# Patient Record
Sex: FEMALE | NOT HISPANIC OR LATINO | URBAN - METROPOLITAN AREA
[De-identification: names, ages, dates, MRNs, and addresses within clinical notes are randomized per-mention and may not be internally consistent; named-entity substitution may affect disease eponyms.]

---

## 2018-01-26 ENCOUNTER — TRANSFERRED RECORDS (OUTPATIENT)
Dept: HEALTH INFORMATION MANAGEMENT | Facility: CLINIC | Age: 48
End: 2018-01-26

## 2018-01-26 LAB
MEASLES CONFIRMATION PCR: POSITIVE
MEASLES CONFIRMATION PCR: POSITIVE
MEASLES SPECIMEN TYPE: ABNORMAL
MUMPS IMMUNE STATUS: POSITIVE
RUBELLA IMMUNITY: POSITIVE

## 2018-10-01 ASSESSMENT — ENCOUNTER SYMPTOMS
JOINT SWELLING: 0
BACK PAIN: 1
MYALGIAS: 0
NECK PAIN: 1
HYPOTENSION: 0
SYNCOPE: 0
MUSCLE CRAMPS: 0
ARTHRALGIAS: 1
HYPERTENSION: 1
LEG PAIN: 1
SLEEP DISTURBANCES DUE TO BREATHING: 0
STIFFNESS: 1
PALPITATIONS: 1
ORTHOPNEA: 0
LIGHT-HEADEDNESS: 0
MUSCLE WEAKNESS: 0
EXERCISE INTOLERANCE: 0

## 2018-10-03 ENCOUNTER — HEALTH MAINTENANCE LETTER (OUTPATIENT)
Age: 48
End: 2018-10-03

## 2018-10-11 ENCOUNTER — OFFICE VISIT (OUTPATIENT)
Dept: INTERNAL MEDICINE | Facility: CLINIC | Age: 48
End: 2018-10-11
Payer: COMMERCIAL

## 2018-10-11 VITALS
HEART RATE: 65 BPM | BODY MASS INDEX: 31.36 KG/M2 | WEIGHT: 170.4 LBS | HEIGHT: 62 IN | DIASTOLIC BLOOD PRESSURE: 79 MMHG | SYSTOLIC BLOOD PRESSURE: 118 MMHG

## 2018-10-11 DIAGNOSIS — Z12.31 ENCOUNTER FOR SCREENING MAMMOGRAM FOR BREAST CANCER: ICD-10-CM

## 2018-10-11 DIAGNOSIS — I10 BENIGN ESSENTIAL HYPERTENSION WITH TARGET BLOOD PRESSURE BELOW 140/90: Primary | ICD-10-CM

## 2018-10-11 DIAGNOSIS — I10 BENIGN ESSENTIAL HYPERTENSION: ICD-10-CM

## 2018-10-11 DIAGNOSIS — E78.01 FAMILIAL HYPERCHOLESTEROLEMIA: ICD-10-CM

## 2018-10-11 DIAGNOSIS — Z29.9 PREVENTIVE MEASURE: ICD-10-CM

## 2018-10-11 RX ORDER — LISINOPRIL 5 MG/1
5 TABLET ORAL DAILY
Qty: 90 TABLET | Refills: 3 | Status: SHIPPED | OUTPATIENT
Start: 2018-10-11 | End: 2019-10-10 | Stop reason: SINTOL

## 2018-10-11 RX ORDER — LISINOPRIL 5 MG/1
5 TABLET ORAL DAILY
Refills: 0 | COMMUNITY
Start: 2018-09-04 | End: 2018-10-11

## 2018-10-11 RX ORDER — LISINOPRIL 5 MG/1
5 TABLET ORAL DAILY
Qty: 90 TABLET | Refills: 3 | Status: SHIPPED | OUTPATIENT
Start: 2018-10-11 | End: 2018-10-11

## 2018-10-11 ASSESSMENT — PAIN SCALES - GENERAL: PAINLEVEL: NO PAIN (0)

## 2018-10-11 NOTE — PATIENT INSTRUCTIONS
Please call [phone] for any billing questions.     Primary Care Center [phone] ([facility])     Please call [phone] to schedule future fasting lab appointment.     Breast Center ([facility]) [phone] ([facility])  Breast Center ([facility]) [phone] ([address])

## 2018-10-11 NOTE — NURSING NOTE
Called [facility] on [address] and cancelled Lisinopril Rx, stated [facility] is not in pt Network, [facility] is or [facility], spoke with patient requested Rx to be called  Into [facility], Lisinopril was called into [facility], also called [facility] on [address] to cancel Rx.

## 2018-10-11 NOTE — NURSING NOTE
Chief Complaint   Patient presents with     Establish Care     Recheck Medication     Blood pressure medication.       Nicole Block LPN at 4:44 PM on October 11, 2018

## 2018-10-11 NOTE — MR AVS SNAPSHOT
After Visit Summary   10/11/2018    Carol Laurent    MRN: [id number]           Patient Information     Date Of Birth          1970        Visit Information        Provider Department      10/11/2018 4:30 PM Kristin Calhoun, JUVE [facility]        Today's Diagnoses     Benign essential hypertension with target blood pressure below 140/90    -  1    Encounter for screening mammogram for breast cancer          Care Instructions    Please call [phone] for any billing questions.     Primary Care Center [phone] ([facility])     Please call [phone] to schedule future fasting lab appointment.     Breast Center ([facility]) [phone] ([facility] [place] [facility])  Breast Center ([facility]) [phone] ([address])                 Follow-ups after your visit        Your next 10 appointments already scheduled     Oct 16, 2018  7:00 AM CDT   LAB with  LAB    Health Lab ([facility])    [address]  1st Floor  [place] [address] [phone]           Please do not eat 10-12 hours before your appointment if you are coming in fasting for labs on lipids, cholesterol, or glucose (sugar). This does not apply to pregnant women. Water, hot tea and black coffee (with nothing added) are okay. Do not drink other fluids, diet soda or chew gum.            Oct 16, 2018  7:30 AM CDT   MA SCREENING DIGITAL BILATERAL with [id number]   [facility] ([facility])    [address], 2nd Floor  [place] [address] [phone]           How do I prepare for my exam? (Food and drink instructions) No Food and Drink Restrictions.  How do I prepare for my exam? (Other instructions) Do not use any powder, lotion or deodorant under your arms or on your breast. If you do, we will ask you to remove it before your exam.  What should I wear: Wear comfortable, two-piece clothing.   "How long does the exam take: Most scans will take 15 minutes.  What should I bring: Bring any previous mammograms from other facilities or have them mailed to the breast center.  Do I need a :  No  is needed.  What do I need to tell my doctor: If you have any allergies, tell your care team.  What should I do after the exam: No restrictions, You may resume normal activities.  What is this test: This test is an x-ray of the breast to look for breast disease. The breast is pressed between two plates to flatten and spread the tissue. An X-ray is taken of the breast from different angles.  Who should I call with questions: If you have any questions, please call the Imaging Department where you will have your exam. Directions, parking instructions, and other information is available on our website, [employer].  Other information about my exam Three-dimensional (3D) mammograms are available at [facility] locations in [facility] and [place]. [facility] locations include [place] and [facility] [place] in [place].  Benefits of 3D mammograms include * Improved rate of cancer detection * Decreases your chance of having to go back for more tests, which means fewer: * \"False-positive\" results (This means that there is an abnormal area but it isn't cancer.) * Invasive testing procedures, such as a biopsy or surgery * Can provide clearer images of the breast if you have dense breast tissue.  *3D mammography is an optional exam that anyone can have with a 2D mammogram. It doesn't replace or take the place of a 2D mammogram. 2D mammograms remain an effective screening test for all women.  Not all insurance companies cover the cost of a 3D mammogram. Check with your insurance. Three-dimensional (3D) mammograms are available at [facility] locations in [facility], [facility], [place], [place], and " "[place]. [facility] locations include [facility] [place] in [place]. Benefits of 3D mammograms include: - Improved rate of cancer detection - Decreases your chance of having to go back for more tests, which means fewer: - \"False-positive\" results (This means that there is an abnormal area but it isn't cancer.) - Invasive testing procedures, such as a biopsy or surgery - Can provide clearer images of the breast if you have dense breast tissue. 3D mammography is an optional exam that anyone can have with a 2D mammogram. It doesn't replace or take the place of a 2D mammogram. 2D mammograms remain an effective screening test for all women.  Not all insurance companies cover the cost of a 3D mammogram. Check with your insurance.              Future tests that were ordered for you today     Open Future Orders        Priority Expected Expires Ordered    Mammogram, routine screening Routine  10/11/2019 10/11/2018    Lipid panel reflex to direct LDL Fasting Routine 10/12/2018 10/25/2018 10/11/2018    Basic metabolic panel Routine 10/12/2018 10/11/2019 10/11/2018    CBC with platelets Routine 10/12/2018 10/25/2018 10/11/2018            Who to contact     Please call your clinic at [phone] to:    Ask questions about your health    Make or cancel appointments    Discuss your medicines    Learn about your test results    Speak to your doctor            Additional Information About Your Visit        [employer] Information     [employer] gives you secure access to your electronic health record. If you see a primary care provider, you can also send messages to your care team and make appointments. If you have questions, please call your primary care clinic.  If you do not have a primary care provider, please call [phone] and they will assist you.      [employer] is an electronic gateway that provides easy, online access to your medical records. With [employer], you can request a clinic appointment, read your " "test results, renew a prescription or communicate with your care team.     To access your existing account, please contact your [facility] or call [phone] for assistance.        Care EveryWhere ID     This is your Care EveryWhere ID. This could be used by other organizations to access your [place] medical records  [id number]        Your Vitals Were     Pulse Height Last Period Breastfeeding? BMI (Body Mass Index)       65 1.581 m (5' 2.25\") 09/11/2018 (Approximate) No 30.92 kg/m2        Blood Pressure from Last 3 Encounters:   10/11/18 118/79    Weight from Last 3 Encounters:   10/11/18 77.3 kg (170 lb 6.4 oz)                 Today's Medication Changes          These changes are accurate as of 10/11/18  5:42 PM.  If you have any questions, ask your nurse or doctor.               Start taking these medicines.        Dose/Directions    lisinopril 5 MG tablet   Commonly known as:  PRINIVIL/ZESTRIL   Used for:  Benign essential hypertension with target blood pressure below 140/90   Started by:  Kristin Calhoun APRN CNP        Dose:  5 mg   Take 1 tablet (5 mg) by mouth daily   Quantity:  90 tablet   Refills:  3            Where to get your medicines      These medications were sent to [place] [address] [place] [address] [address]     Phone:  [phone]     lisinopril 5 MG tablet                Primary Care Provider Office Phone # Fax #    [facility] [phone]       [address] [place] [id number]        Equal Access to Services     SARAH CHAIREZ AH: Hadii tati smith Sotammy, waaxda luqadaha, qaybta kaalmada yessenia alegre. So [facility] [phone].    ATENCIÓN: Si habla español, tiene a warren disposición servicios gratuitos de asistencia lingüística. Llame al [phone].    We comply with applicable federal civil rights laws and [place] laws. We do not " discriminate on the basis of race, color, national origin, age, disability, sex, sexual orientation, or gender identity.            Thank you!     Thank you for choosing [facility]  for your care. Our goal is always to provide you with excellent care. Hearing back from our patients is one way we can continue to improve our services. Please take a few minutes to complete the written survey that you may receive in the mail after your visit with us. Thank you!             Your Updated Medication List - Protect others around you: Learn how to safely use, store and throw away your medicines at www.disposemymeds.org.          This list is accurate as of 10/11/18  5:42 PM.  Always use your most recent med list.                   Brand Name Dispense Instructions for use Diagnosis    lisinopril 5 MG tablet    PRINIVIL/ZESTRIL    90 tablet    Take 1 tablet (5 mg) by mouth daily    Benign essential hypertension with target blood pressure below 140/90

## 2018-10-11 NOTE — PROGRESS NOTES
[facility]  Primary Care Center   Kristin MONTEIRORae Calhoun, APRN CNP  10/11/2018      Chief Complaint:   Establish Care and Recheck Medication       History of Present Illness:   Carol Laurent is a 48 year old female accompanied by her 17 yo son, with a history of elevated cholesterol and fatty liver who presents to establish care. Carol would like to establish care at [facility] due to changes in her insurance policy. She was previously receiving care through the [facility], where she works. Carol reports that she has a history of elevated cholesterol and brings lab results from October 2017 (see results below).     Carol has a history of elevated blood pressure, and has been taking Lisinopril 5 mg daily for the past 6 months. Prior to starting Lisinopril, Carol reports her systolic pressure averaged around 130 and her diastolic blood pressure ranged from 93 to 99. Today her blood pressure is 118/79. She wonders if there is another medication that she should take instead of Lisinopril to better control her blood pressure, as she is concerned about the possibility of addiction or experiencing side effects, although she denies experiencing side effects.     Other concerns discussed:  Regarding her history of fatty liver, Carol reports that she was diagnosed with fatty liver while living in [place]. After undergoing another test in the [place] her liver tests appeared normal.     Carol reports that she sleeps 5 to 6 hours per night, and struggles to sleep deeply. She adds that it usually takes about 1 hour for her to fall asleep, and wakes up on her own without an alarm. She denies feeling tired during the day, but does report that she drinks 2 to 3 cups of coffee each morning.     Carol believes her most recent pap smear took place in 2017. She does not believe she has ever undergone a mammogram, as [facility] did not provide those.    Carol reports that she runs 3 miles 3-5 times per week for exercise.    Carol was born in [place], and moved [place] the  [place] about [place] ago. She currently works at the [employer].     Carol has already received a flu vaccine.      Review of Systems:   Answers for HPI/ROS submitted by the patient on 10/1/2018   General Symptoms: No  Skin Symptoms: No  HENT Symptoms: No  EYE SYMPTOMS: No  HEART SYMPTOMS: Yes  LUNG SYMPTOMS: No  INTESTINAL SYMPTOMS: No  URINARY SYMPTOMS: No  GYNECOLOGIC SYMPTOMS: No  BREAST SYMPTOMS: No  SKELETAL SYMPTOMS: Yes  BLOOD SYMPTOMS: No  NERVOUS SYSTEM SYMPTOMS: No  MENTAL HEALTH SYMPTOMS: No  Chest pain or pressure: No  Fast or irregular heartbeat: Yes  Pain in legs with walking: Yes  Trouble breathing while lying down: No  Fingers or toes appear blue: No  High blood pressure: Yes  Low blood pressure: No  Fainting: No  Murmurs: No  Pacemaker: No  Varicose veins: No  Edema or swelling: No  Wake up at night with shortness of breath: No  Light-headedness: No  Exercise intolerance: No  Back pain: Yes  Muscle aches: No  Neck pain: Yes  Swollen joints: No  Joint pain: Yes  Bone pain: No  Muscle cramps: No  Muscle weakness: No  Joint stiffness: Yes      Active Medications:   Current Outpatient Prescriptions:      lisinopril (PRINIVIL/ZESTRIL) 5 MG tablet, Take 1 tablet (5 mg) by mouth daily, Disp: 90 tablet, Rfl: 3    Allergies:   Review of patient's allergies indicates no known allergies.      Past Medical History:  Elevated cholesterol  Elevated blood pressure  Fatty liver    Past Surgical History:  The patient does not have any past pertinent surgical history.    Family History   Problem Relation Age of Onset     HEART DISEASE Mother      Hypertension Father      Diabetes Father      Hypertension Brother      Liver Disease Brother         Social History     Social History     Marital status:      Spouse name: N/A     Number of children: 1     Years of education: N/A     Occupational History     softwear       Social History Main Topics     Smoking status: Never Smoker      "Smokeless tobacco: Never Used     Alcohol use No     Drug use: Not on file     Sexual activity: Not on file     Other Topics Concern     Not on file     Social History Narrative    Native of [place] 2014.     One son.     .   in [place].          Physical Exam:   /79  Pulse 65  Ht 1.581 m (5' 2.25\")  Wt 77.3 kg (170 lb 6.4 oz)  LMP 09/11/2018 (Approximate)  Breastfeeding? No  BMI 30.92 kg/m2   Wt Readings from Last 1 Encounters:   10/11/18 77.3 kg (170 lb 6.4 oz)     Constitutional: no distress, comfortable, pleasant   Eyes: anicteric, conjunctiva pink, normal extra-ocular movements   Ears, Nose and Throat: tympanic membranes clear,  throat clear.   Neck: supple with full range of motion, no thyromegaly.   Cardiovascular: regular rate and rhythm, normal S1 and S2, no murmurs, rubs or gallops, peripheral pulses full and symmetric   Respiratory: clear to auscultation, no wheezes or crackles, normal breath sounds   Gastrointestinal: positive bowel sounds, nontender, no hepatosplenomegaly, no masses   Musculoskeletal: full range of motion, no edema   Breast exam was not done today as her son was with her and her schedule did not allow.  Skin: no concerning lesions, no jaundice, temp normal   Neurological:  normal speech, no tremor. A and O x 3, good historian.  Psychological: appropriate mood, good eye contact, normal affect   Lymph: no cervical,  supraclavicular, infraclavicular nodes.      Results Discussed:  Cholesterol, October 2017:  Total cholesterol: 187  LDL: 110  Triglycerides: 167  HDL: 49     Assessment and Plan:  Benign essential hypertension with target blood pressure below 140/90  Medina will continue to take Lisinopril 5 mg daily. She is due for a routine lipid panel, as well as a basic metabolic panel and CBC.     - lisinopril (PRINIVIL/ZESTRIL) 5 MG tablet  Dispense: 90 tablet; Refill: 3  - Lipid panel reflex to direct LDL Fasting  - Basic metabolic panel  - CBC with " platelets    Encounter for screening mammogram for breast cancer  See HPI.  - Mammogram, routine screening        Scribe Disclosure:   I, Marilynn Barertt, am serving as a scribe to document services personally performed by JUVE Hsieh CNP at this visit, based upon the provider's statements to me. All documentation has been reviewed by the aforementioned provider prior to being entered into the official medical record.     Portions of this medical record were completed by a scribe. UPON MY REVIEW AND AUTHENTICATION BY ELECTRONIC SIGNATURE, this confirms (a) I performed the applicable clinical services, and (b) the record is accurate.     Total time spent 30  minutes.  More than 50% of the time spent with Ms. Laurent on counseling / coordinating her care.  Kristin AN, CNP

## 2018-10-16 ENCOUNTER — RADIANT APPOINTMENT (OUTPATIENT)
Dept: MAMMOGRAPHY | Facility: CLINIC | Age: 48
End: 2018-10-16
Attending: NURSE PRACTITIONER
Payer: COMMERCIAL

## 2018-10-16 DIAGNOSIS — Z12.31 ENCOUNTER FOR SCREENING MAMMOGRAM FOR BREAST CANCER: ICD-10-CM

## 2018-10-16 DIAGNOSIS — I10 BENIGN ESSENTIAL HYPERTENSION WITH TARGET BLOOD PRESSURE BELOW 140/90: ICD-10-CM

## 2018-10-16 PROBLEM — E78.01 FAMILIAL HYPERCHOLESTEROLEMIA: Status: ACTIVE | Noted: 2018-10-16

## 2018-10-16 LAB
ANION GAP SERPL CALCULATED.3IONS-SCNC: 4 MMOL/L (ref 3–14)
BUN SERPL-MCNC: 14 MG/DL (ref 7–30)
CALCIUM SERPL-MCNC: 8.7 MG/DL (ref 8.5–10.1)
CHLORIDE SERPL-SCNC: 104 MMOL/L (ref 94–109)
CHOLEST SERPL-MCNC: 174 MG/DL
CO2 SERPL-SCNC: 29 MMOL/L (ref 20–32)
CREAT SERPL-MCNC: 0.77 MG/DL (ref 0.52–1.04)
ERYTHROCYTE [DISTWIDTH] IN BLOOD BY AUTOMATED COUNT: 12.3 % (ref 10–15)
GFR SERPL CREATININE-BSD FRML MDRD: 79 ML/MIN/1.7M2
GLUCOSE SERPL-MCNC: 95 MG/DL (ref 70–99)
HCT VFR BLD AUTO: 40.7 % (ref 35–47)
HDLC SERPL-MCNC: 44 MG/DL
HGB BLD-MCNC: 13.5 G/DL (ref 11.7–15.7)
LDLC SERPL CALC-MCNC: 104 MG/DL
MCH RBC QN AUTO: 32.5 PG (ref 26.5–33)
MCHC RBC AUTO-ENTMCNC: 33.2 G/DL (ref 31.5–36.5)
MCV RBC AUTO: 98 FL (ref 78–100)
NONHDLC SERPL-MCNC: 131 MG/DL
PLATELET # BLD AUTO: 270 10E9/L (ref 150–450)
POTASSIUM SERPL-SCNC: 4.2 MMOL/L (ref 3.4–5.3)
RBC # BLD AUTO: 4.16 10E12/L (ref 3.8–5.2)
SODIUM SERPL-SCNC: 137 MMOL/L (ref 133–144)
TRIGL SERPL-MCNC: 132 MG/DL
WBC # BLD AUTO: 8.8 10E9/L (ref 4–11)

## 2018-10-16 RX ORDER — ASCORBIC ACID 1000 MG
1 TABLET ORAL DAILY
Qty: 30 CAPSULE | Refills: 0
Start: 2018-10-16

## 2018-10-16 RX ORDER — CHLORAL HYDRATE 500 MG
2 CAPSULE ORAL DAILY
Refills: 0
Start: 2018-10-16

## 2018-10-16 RX ORDER — MULTIVITAMIN,THERAPEUTIC
1 TABLET ORAL DAILY
Refills: 0
Start: 2018-10-16

## 2018-10-29 ENCOUNTER — RADIANT APPOINTMENT (OUTPATIENT)
Dept: MAMMOGRAPHY | Facility: CLINIC | Age: 48
End: 2018-10-29
Attending: NURSE PRACTITIONER
Payer: COMMERCIAL

## 2018-10-29 ENCOUNTER — MEDICAL CORRESPONDENCE (OUTPATIENT)
Dept: HEALTH INFORMATION MANAGEMENT | Facility: CLINIC | Age: 48
End: 2018-10-29

## 2018-10-29 DIAGNOSIS — R92.8 ABNORMAL MAMMOGRAM OF LEFT BREAST: ICD-10-CM

## 2019-10-03 ASSESSMENT — ENCOUNTER SYMPTOMS
NECK MASS: 0
MUSCLE CRAMPS: 0
SINUS CONGESTION: 0
SORE THROAT: 1
BACK PAIN: 1
SINUS PAIN: 0
TROUBLE SWALLOWING: 0
DOUBLE VISION: 1
STIFFNESS: 0
EYE PAIN: 0
EYE IRRITATION: 0
SMELL DISTURBANCE: 0
EYE WATERING: 0
MUSCLE WEAKNESS: 0
TASTE DISTURBANCE: 0
EYE REDNESS: 0
HOARSE VOICE: 0
ARTHRALGIAS: 0
NECK PAIN: 1
MYALGIAS: 0
JOINT SWELLING: 0

## 2019-10-10 ENCOUNTER — OFFICE VISIT (OUTPATIENT)
Dept: INTERNAL MEDICINE | Facility: CLINIC | Age: 49
End: 2019-10-10
Payer: COMMERCIAL

## 2019-10-10 VITALS
OXYGEN SATURATION: 99 % | DIASTOLIC BLOOD PRESSURE: 77 MMHG | SYSTOLIC BLOOD PRESSURE: 110 MMHG | WEIGHT: 170 LBS | BODY MASS INDEX: 30.84 KG/M2 | HEART RATE: 64 BPM

## 2019-10-10 DIAGNOSIS — Z13.1 SCREENING FOR DIABETES MELLITUS: ICD-10-CM

## 2019-10-10 DIAGNOSIS — D25.9 UTERINE LEIOMYOMA, UNSPECIFIED LOCATION: ICD-10-CM

## 2019-10-10 DIAGNOSIS — I10 BENIGN ESSENTIAL HYPERTENSION: ICD-10-CM

## 2019-10-10 DIAGNOSIS — Z00.00 ENCOUNTER FOR ROUTINE ADULT HEALTH EXAMINATION WITHOUT ABNORMAL FINDINGS: ICD-10-CM

## 2019-10-10 DIAGNOSIS — Z00.00 ROUTINE HISTORY AND PHYSICAL EXAMINATION OF ADULT: Primary | ICD-10-CM

## 2019-10-10 DIAGNOSIS — Z76.89 ENCOUNTER TO ESTABLISH CARE WITH NEW DOCTOR: ICD-10-CM

## 2019-10-10 DIAGNOSIS — K76.0 FATTY LIVER: ICD-10-CM

## 2019-10-10 LAB
ALBUMIN SERPL-MCNC: 4 G/DL (ref 3.4–5)
ALP SERPL-CCNC: 80 U/L (ref 40–150)
ALT SERPL W P-5'-P-CCNC: 44 U/L (ref 0–50)
ANION GAP SERPL CALCULATED.3IONS-SCNC: 6 MMOL/L (ref 3–14)
AST SERPL W P-5'-P-CCNC: 33 U/L (ref 0–45)
BILIRUB SERPL-MCNC: 0.5 MG/DL (ref 0.2–1.3)
BUN SERPL-MCNC: 15 MG/DL (ref 7–30)
CALCIUM SERPL-MCNC: 9 MG/DL (ref 8.5–10.1)
CHLORIDE SERPL-SCNC: 105 MMOL/L (ref 94–109)
CHOLEST SERPL-MCNC: 176 MG/DL
CO2 SERPL-SCNC: 28 MMOL/L (ref 20–32)
CREAT SERPL-MCNC: 0.68 MG/DL (ref 0.52–1.04)
GFR SERPL CREATININE-BSD FRML MDRD: >90 ML/MIN/{1.73_M2}
GLUCOSE SERPL-MCNC: 95 MG/DL (ref 70–99)
HBA1C MFR BLD: 5.8 % (ref 0–5.6)
HDLC SERPL-MCNC: 51 MG/DL
LDLC SERPL CALC-MCNC: 103 MG/DL
NONHDLC SERPL-MCNC: 125 MG/DL
POTASSIUM SERPL-SCNC: 4.1 MMOL/L (ref 3.4–5.3)
PROT SERPL-MCNC: 7.8 G/DL (ref 6.8–8.8)
SODIUM SERPL-SCNC: 139 MMOL/L (ref 133–144)
TRIGL SERPL-MCNC: 109 MG/DL
TSH SERPL DL<=0.005 MIU/L-ACNC: 1.84 MU/L (ref 0.4–4)

## 2019-10-10 RX ORDER — LOSARTAN POTASSIUM 25 MG/1
12.5 TABLET ORAL DAILY
Qty: 30 TABLET | Refills: 6 | Status: SHIPPED | OUTPATIENT
Start: 2019-10-10 | End: 2019-11-07

## 2019-10-10 ASSESSMENT — ENCOUNTER SYMPTOMS
SYNCOPE: 0
DECREASED LIBIDO: 0
PARALYSIS: 0
FEVER: 0
DISTURBANCES IN COORDINATION: 0
LEG PAIN: 0
FLANK PAIN: 0
MEMORY LOSS: 0
PALPITATIONS: 0
ABDOMINAL PAIN: 0
MUSCLE CRAMPS: 0
SEIZURES: 0
DIARRHEA: 0
HEADACHES: 0
POLYDIPSIA: 0
NECK PAIN: 1
EYE PAIN: 0
JAUNDICE: 0
HOT FLASHES: 0
EXTREMITY NUMBNESS: 0
POOR WOUND HEALING: 0
RECTAL BLEEDING: 0
NERVOUS/ANXIOUS: 0
EXERCISE INTOLERANCE: 0
HEMOPTYSIS: 0
TACHYCARDIA: 0
TASTE DISTURBANCE: 0
SPUTUM PRODUCTION: 0
PANIC: 0
STIFFNESS: 0
BLOOD IN STOOL: 0
NECK MASS: 0
LOSS OF CONSCIOUSNESS: 0
WHEEZING: 0
FATIGUE: 0
RESPIRATORY PAIN: 0
TINGLING: 0
DOUBLE VISION: 1
TREMORS: 0
HOARSE VOICE: 0
POSTURAL DYSPNEA: 0
BLOATING: 0
SKIN CHANGES: 0
SLEEP DISTURBANCES DUE TO BREATHING: 0
BREAST PAIN: 0
CLAUDICATION: 0
POLYPHAGIA: 0
DIZZINESS: 0
MUSCLE WEAKNESS: 0
EYE WATERING: 0
SPEECH CHANGE: 0
JOINT SWELLING: 0
HEARTBURN: 0
HEMATURIA: 0
SORE THROAT: 1
SINUS PAIN: 0
WEIGHT LOSS: 0
HALLUCINATIONS: 0
SMELL DISTURBANCE: 0
ALTERED TEMPERATURE REGULATION: 0
SNORES LOUDLY: 0
RECTAL PAIN: 0
ORTHOPNEA: 0
HYPOTENSION: 0
CHILLS: 0
CONSTIPATION: 0
TROUBLE SWALLOWING: 0
NAUSEA: 0
DECREASED APPETITE: 0
LEG SWELLING: 0
NIGHT SWEATS: 0
WEAKNESS: 0
SHORTNESS OF BREATH: 0
DYSPNEA ON EXERTION: 0
EYE REDNESS: 0
DECREASED CONCENTRATION: 0
HYPERTENSION: 0
DYSURIA: 0
NUMBNESS: 0
BRUISES/BLEEDS EASILY: 0
INCREASED ENERGY: 0
ARTHRALGIAS: 0
BOWEL INCONTINENCE: 0
LIGHT-HEADEDNESS: 0
BREAST MASS: 0
MYALGIAS: 0
EYE IRRITATION: 0
COUGH DISTURBING SLEEP: 0
NAIL CHANGES: 0
SWOLLEN GLANDS: 0
SINUS CONGESTION: 0
DIFFICULTY URINATING: 0
WEIGHT GAIN: 0
INSOMNIA: 0
BACK PAIN: 1
VOMITING: 0
DEPRESSION: 0
COUGH: 0

## 2019-10-10 NOTE — NURSING NOTE
Chief Complaint   Patient presents with     Physical     pt is also interested in blood work     Kimberly Nissen, EMT at 9:01 AM on 10/10/2019

## 2019-10-10 NOTE — PATIENT INSTRUCTIONS
It was nice to see you today. Head down to the lab for blood work. Let's plan on meeting again in 3 weeks.

## 2019-10-10 NOTE — PROGRESS NOTES
[facility]         HPI:       Carol Laurent is a 49 year old female with a history of HTN, HLD, NAFLD, who presents to establish care. The patient takes lisinopril for HTN; notes that intermittently, she has a cough with this agent that improves if she holds some doses, and which restarts when she reinitiates this agent. The patient states she exercises 3 times a week, runs about 3 miles for about 40 minutes. Notes she has significant stress from her graduate studies, and running/exercise are a great outlet for this stress. 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 Her goal is to lose 10 lbs through exercise. The patient endorses a headache, mostly on the right side. No retroorbital pain, no aura, no photosensitivity. Also endorses blurry vision at night, mostly after she has been on her computer for several hours. The patient notes that she has significantly increased computer use at night and has noticed vision changes since her screen time has increased. Has a traditional Chinese diet, eats pork or chicken, vegetables and bread/oatmeal/rice with each meal. Does not control her salt intake. The patient notes that she previously had palpitations, about 5 years ago, attributes partly to heavy EtOH intake. Stopped drinking EtOH about 5 years ago in [place]; notes she was told at the time that she has fatty liver seen on RUQ U/S. Has not had repeat evaluation in the [place]. Has intermittent nausea as well as lightheadedness. Denies fever, chills, emesis, dizziness, chest pain, shortness of breath, palpitations, abdominal pain, diarrhea, constipation, hematochezia, melena, dysuria or hematuria.     Problem, Medication and Allergy Lists were reviewed and are current.     Patient Active Problem List    Diagnosis Date Noted     Benign essential hypertension 10/16/2018     Priority: Medium     Familial hypercholesterolemia 10/16/2018     Priority: Medium         Current Outpatient Medications    Medication Sig Dispense Refill     Calcium 500-100 MG-UNIT CHEW Take 1 chew tab by mouth daily       Coenzyme Q10 (CO Q 10) 10 MG CAPS Take 1 capsule by mouth daily 30 capsule 0     fish oil-omega-3 fatty acids 1000 MG capsule Take 2 capsules (2 g) by mouth daily  0     losartan (COZAAR) 25 MG tablet Take 0.5 tablets (12.5 mg) by mouth daily 30 tablet 6     Misc Natural Products ([employer] GLUCOSAMINE-CHONDROITIN) CAPS Take 1 capsule by mouth daily       multivitamin, therapeutic (THERA-VIT) TABS tablet Take 1 tablet by mouth daily  0     No Known Allergies    Patient is a new patient to this clinic and so I reviewed/updated the Past Medical History, the Family History and the Social History.    Past Medical History:   Diagnosis Date     Benign essential HTN      Hyperlipidemia      MILLER (nonalcoholic steatohepatitis) 2006    dx in [place] with subsequent 10 lb. weight loss     Nonalcoholic fatty liver disease without nonalcoholic steatohepatitis (MILLER)      Family History     Problem (# of Occurrences) Relation (Name,Age of Onset)    Diabetes (2) Father, Paternal Grandmother    Heart Disease (1) Mother    Hypertension (2) Father, Brother    Liver Disease (1) Brother        Social History     Socioeconomic History     Marital status:      Spouse name: None     Number of children: 1     Years of education: None     Highest education level: None   Occupational History     Occupation: Education doctoral student   Social Needs     Financial resource strain: None     Food insecurity:     Worry: None     Inability: None     Transportation needs:     Medical: None     Non-medical: None   Tobacco Use     Smoking status: Never Smoker     Smokeless tobacco: Never Used   Substance and Sexual Activity     Alcohol use: No     Comment: Previous heavy use, >5 years ago     Drug use: Never     Sexual activity: Not Currently   Lifestyle     Physical activity:     Days per week: None     Minutes per session: None     Stress: None    Relationships     Social connections:     Talks on phone: None     Gets together: None     Attends Restoration service: None     Active member of club or organization: None     Attends meetings of clubs or organizations: None     Relationship status: None     Intimate partner violence:     Fear of current or ex partner: None     Emotionally abused: None     Physically abused: None     Forced sexual activity: None   Other Topics Concern     None   Social History Narrative    Immigrated from [place] in 2014. Lives alone in [place]. Son goes to college. Doctoral student in education.            Review of Systems:     Review of Systems     Constitutional:  Negative for fever, chills, weight loss, weight gain, fatigue, decreased appetite, night sweats, recent stressors, height gain, height loss, post-operative complications, incisional pain, hallucinations, increased energy, hyperactivity and confused.   HENT:  Positive for tinnitus and sore throat. Negative for ear pain, hearing loss, nosebleeds, trouble swallowing, hoarse voice, mouth sores, ear discharge, tooth pain, gum tenderness, taste disturbance, smell disturbance, hearing aid, bleeding gums, dry mouth, sinus pain, sinus congestion and neck mass.    Eyes:  Positive for double vision. Negative for pain, redness, eye pain, decreased vision, eye watering, eye bulging, eye dryness, flashing lights, spots, floaters, strabismus, tunnel vision, jaundice and eye irritation.   Respiratory:   Negative for cough, hemoptysis, sputum production, shortness of breath, wheezing, sleep disturbances due to breathing, snores loudly, respiratory pain, dyspnea on exertion, cough disturbing sleep and postural dyspnea.    Cardiovascular:  Negative for chest pain, dyspnea on exertion, palpitations, orthopnea, claudication, leg swelling, fingers/toes turn blue, hypertension, hypotension, syncope, history of heart murmur, chest pain on exertion, chest pain at rest, pacemaker, few scattered  varicosities, leg pain, sleep disturbances due to breathing, tachycardia, light-headedness, exercise intolerance and edema.   Gastrointestinal:  Negative for heartburn, nausea, vomiting, abdominal pain, diarrhea, constipation, blood in stool, melena, rectal pain, bloating, hemorrhoids, bowel incontinence, jaundice, rectal bleeding, coffee ground emesis and change in stool.   Genitourinary:  Negative for bladder incontinence, dysuria, urgency, hematuria, flank pain, vaginal discharge, difficulty urinating, genital sores, dyspareunia, decreased libido, nocturia, voiding less frequently, arousal difficulty, abnormal vaginal bleeding, excessive menstruation, menstrual changes, hot flashes, vaginal dryness and postmenopausal bleeding.   Musculoskeletal:  Positive for back pain and neck pain. Negative for myalgias, joint swelling, arthralgias, stiffness, muscle cramps, bone pain, muscle weakness and fracture.   Skin:  Negative for nail changes, itching, poor wound healing, rash, hair changes, skin changes, acne, warts, poor wound healing, scarring, flaky skin, Raynaud's phenomenon, sensitivity to sunlight and skin thickening.   Neurological:  Negative for dizziness, tingling, tremors, speech change, seizures, loss of consciousness, weakness, light-headedness, numbness, headaches, disturbances in coordination, extremity numbness, memory loss, difficulty walking and paralysis.   Endo/Heme:  Negative for anemia, swollen glands and bruises/bleeds easily.   Psychiatric/Behavioral:  Negative for depression, hallucinations, memory loss, decreased concentration, mood swings and panic attacks.    Breast:  Negative for breast discharge, breast mass, breast pain and nipple retraction.   Endocrine:  Negative for altered temperature regulation, polyphagia, polydipsia, unwanted hair growth and change in facial hair.                  Physical Exam:   /77   Pulse 64   Wt 77.1 kg (170 lb)   LMP 09/07/2019   SpO2 99%   BMI 30.84  kg/m    Body mass index is 30.84 kg/m .  Physical Examination:    General:  Conversant, generally healthy appearing, no acute distress  Head: atraumatic  Eyes:  Pupils 2-3 mm, sclera white, EOM's full, conjunctiva moist, no periorbital swelling    Ears:  TM's normal, EAC's patent, clear of cerumen  Nose:  Nasal passages clear, turbinates not swollen  Throat/Mouth:  No pharyngeal erythema, exudate, ulcers, oral mucosa and tongue moist, normal hard and soft palate  Neck:  Trachea midline, Full AROM, supple, thyroid smooth, symmetric, not enlarged, no nodules, no neck lymphadenopathy  Lungs:  Clear to auscultation throughout, no wheezes, rhonchi or rales. Normal respiratory effort and no intercostal retractions.  C/V:  Regular rate and rhythm, no murmurs, rubs or gallops.  No JVD, no carotid bruits. Radial and DP pulses 2+, equal and regular.  Abdomen:  Not distended.  Bowel sounds active.  No tenderness, no hepatosplenomegaly or masses.  No CVA tenderness or masses.  Lymph:  No cervical lymph nodes.  Neuro: Alert and oriented, face symmetric. Able to get on/off exam table without assistance.  Strength grossly intact. No tremor.  Gait steady.   M/S:   No joint deformities noted.  No joint swelling.  Skin:   Normal temperature., turgor and texture. No rashes, suspicious lesions,  jaundice or ulcers    Extremities:  No peripheral edema, no digital cyanosis  Psych:  Alert and oriented. Appropriate affect.  Not psychomotor slowed.  No signs of anxiety or agitation.        Results:     Laboratory and imaging results were reviewed by me.    Assessment and Plan   Carol Laurent is a 49 year old female with a history of HTN, HLD, NAFLD, who presents to [facility], found to have prediabetes, and adverse reaction to lisinopril.    Fatty liver: Per patient, diagnosed in [place], with RUQ U/S showing liver changes. No workup in the [place]. Repeat CMP was normal. No RUQ or abdominal pain, no jaundice. Will consider repeat RUQ U/S at next  visit.     Prediabetes: A1c this visit 5.8, in prediabetes range. Will discuss with patient in more detail at follow up visit.     Uterine leiomyoma, unspecified location: Per patient, TVUS in [place] showed uterine fibroids. Patient not currently endorsing pelvic pain or abnormal uterine bleeding. Will refer to OB/GYN for consideration of follow up imaging, Pap.   -     OB/GYN REFERRAL    Benign essential hypertension  Adverse effect to ACE inhibitor: The patient endorses cough with lisinopril. Will switch to low dose losartan to see if patient tolerates this agent.  -     losartan (COZAAR) 25 MG tablet; Take 0.5 tablets (12.5 mg) by mouth daily    Encounter for routine adult health examination without abnormal findings: Lipid panel with normal total cholesterol, normal HDL, above desirable LDL. Euthyroid. Patient has already received influenza vaccination this year.    Eye fatigue: Most likely due to nighttime computer use and blue light. Advised patient to give eyes rest for at least 5-10 minutes every 30 minutes of computer use. No eye exam in several years. Will refer to Ophthalmology.  -     OPHTHALMOLOGY ADULT REFERRAL    Options for treatment and follow-up care were reviewed with the patient. Carol Laurent engaged in the decision making process and verbalized understanding of the options discussed and agreed with the final plan.    The patient should return to clinic for follow up with me in 3 weeks.    Herbert June MD PhD  Oct 10, 2019    The patient was seen and discussed with Dr. Leigh Choi, who agrees with the assessment and plan.   Teaching Physician Note:  I was present during the visit and the patient was seen and examined by me.   I discussed the case with the resident and agree with the note as documented by the resident with the following exceptions:  None.    Leigh Choi M.D.  Internal Medicine   pager [phone]

## 2019-10-14 NOTE — PROGRESS NOTES
"[facility]  Gynecology clinic visit    HPI:    Carol Laurent is a 49 year old , here to discuss fibroids. Per patient she was diagnosed with fibroids via an ultrasound in [place]. Denies any pelvic pain. Reports her menses is heavy at times. Up until , she had regular cycles. Now her periods have started to become irregular. Her last evaluation for fibroids was about 5 years ago, and she would like to be evaluated again. No other complaints today. Her medical history is pertinent for HTN and NAFLD. Flu vaccine on 2019    GYN History  - LMP: 2019  - Menses: see above  - Pap Smears: About 5 years ago, does not know if she has history of abnormal pap smears   - Contraception: none  - Sexual Activity: not sexually active  - Hx STIs: denies    OBHx    SAB x2  1 Full term via  sections    PMHx:   Past Medical History:   Diagnosis Date     Benign essential HTN      Hyperlipidemia      MILLER (nonalcoholic steatohepatitis)     dx in [place] with subsequent 10 lb. weight loss     Nonalcoholic fatty liver disease without nonalcoholic steatohepatitis (MILLER)        PSHx:   Past Surgical History:   Procedure Laterality Date     C LAP OVARIAN CYSTOTOMY         Meds:   Current Outpatient Medications   Medication     Coenzyme Q10 (CO Q 10) 10 MG CAPS     fish oil-omega-3 fatty acids 1000 MG capsule     losartan (COZAAR) 25 MG tablet     Misc Natural Products ([employer]S GLUCOSAMINE-CHONDROITIN) CAPS     multivitamin, therapeutic (THERA-VIT) TABS tablet     Calcium 500-100 MG-UNIT CHEW     No current facility-administered medications for this visit.        Allergies:   No Known Allergies     SocHx: Denies tobacco, alcohol and recreational drugs. 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 Normal hair distribution. Vagina is without lesions. physiologic discharge. Cervix nulliparous, no lesions, no cervical motion tenderness. Uterus is anteverted and slightly enlarged, about 9-10 in size. Patient was tender with bimanual exam and it was limited due to her discomfort.     Assessment/Plan  Carol Laurent is a 49 year old  female here for evaluation of known fibroid uterus. Her previous evaluation was 5 years ago in [place] and she desires another evaluation.     - Pelvic ultrasound ordered. Clinic appointment to follow to discuss results  - Cotesting collected, we will contact her with the results.   - Discussed irregular menses is likely due to perimenopause. Perimenopause is normal at her age. Discussed symptoms of menopause and that if these symptoms are bothersome we can help with HRT or other medical management depending on the bothersome symptom.     Follow Up: clinic appointment after pelvic ultrasound     Discussed with Dr. Srinivas Roque MD PhD  Ob/Gyn PGY-4  10/16/2019 3:36 PM    The Patient was seen in [facility] by MALU ROQUE.  I reviewed the history & exam. Assessment and plan were jointly made.    Melinda Espino MD

## 2019-10-16 ENCOUNTER — OFFICE VISIT (OUTPATIENT)
Dept: OBGYN | Facility: CLINIC | Age: 49
End: 2019-10-16
Attending: INTERNAL MEDICINE
Payer: COMMERCIAL

## 2019-10-16 VITALS
BODY MASS INDEX: 31.15 KG/M2 | DIASTOLIC BLOOD PRESSURE: 70 MMHG | WEIGHT: 169.3 LBS | HEART RATE: 80 BPM | HEIGHT: 62 IN | SYSTOLIC BLOOD PRESSURE: 104 MMHG

## 2019-10-16 DIAGNOSIS — D25.9 UTERINE LEIOMYOMA, UNSPECIFIED LOCATION: Primary | ICD-10-CM

## 2019-10-16 DIAGNOSIS — Z12.4 CERVICAL CANCER SCREENING: ICD-10-CM

## 2019-10-16 PROCEDURE — G0145 SCR C/V CYTO,THINLAYER,RESCR: HCPCS | Performed by: OBSTETRICS & GYNECOLOGY

## 2019-10-16 PROCEDURE — 87624 HPV HI-RISK TYP POOLED RSLT: CPT | Performed by: OBSTETRICS & GYNECOLOGY

## 2019-10-16 PROCEDURE — G0476 HPV COMBO ASSAY CA SCREEN: HCPCS | Performed by: OBSTETRICS & GYNECOLOGY

## 2019-10-16 PROCEDURE — G0463 HOSPITAL OUTPT CLINIC VISIT: HCPCS | Mod: ZF

## 2019-10-16 ASSESSMENT — ANXIETY QUESTIONNAIRES
5. BEING SO RESTLESS THAT IT IS HARD TO SIT STILL: SEVERAL DAYS
2. NOT BEING ABLE TO STOP OR CONTROL WORRYING: SEVERAL DAYS
3. WORRYING TOO MUCH ABOUT DIFFERENT THINGS: SEVERAL DAYS
7. FEELING AFRAID AS IF SOMETHING AWFUL MIGHT HAPPEN: SEVERAL DAYS
GAD7 TOTAL SCORE: 5
1. FEELING NERVOUS, ANXIOUS, OR ON EDGE: NOT AT ALL
6. BECOMING EASILY ANNOYED OR IRRITABLE: NOT AT ALL

## 2019-10-16 ASSESSMENT — PATIENT HEALTH QUESTIONNAIRE - PHQ9
SUM OF ALL RESPONSES TO PHQ QUESTIONS 1-9: 9
5. POOR APPETITE OR OVEREATING: SEVERAL DAYS

## 2019-10-16 ASSESSMENT — MIFFLIN-ST. JEOR: SCORE: 1346.19

## 2019-10-16 NOTE — PATIENT INSTRUCTIONS
Nice to meet you today!  Please schedule a pelvic ultrasound and a clinic appointment to discuss the results.

## 2019-10-16 NOTE — LETTER
10/16/2019       RE: Carol Laurent  [address]  [place] [address]     Dear Colleague,    Thank you for referring your patient, Carol Laurent, to the [facility] at [facility]. Please see a copy of my visit note below.    [facility]  Gynecology clinic visit    HPI:    Carol Laurent is a 49 year old , here to discuss fibroids. Per patient she was diagnosed with fibroids via an ultrasound in [place]. Denies any pelvic pain. Reports her menses is heavy at times. Up until , she had regular cycles. Now her periods have started to become irregular. Her last evaluation for fibroids was about 5 years ago, and she would like to be evaluated again. No other complaints today. Her medical history is pertinent for HTN and NAFLD. Flu vaccine on 2019    GYN History  - LMP: 2019  - Menses: see above  - Pap Smears: About 5 years ago, does not know if she has history of abnormal pap smears   - Contraception: none  - Sexual Activity: not sexually active  - Hx STIs: denies    OBHx    SAB x2  1 Full term via  sections    PMHx:   Past Medical History:   Diagnosis Date     Benign essential HTN      Hyperlipidemia      MILLER (nonalcoholic steatohepatitis) 2006    dx in [place] with subsequent 10 lb. weight loss     Nonalcoholic fatty liver disease without nonalcoholic steatohepatitis (MILLER)        PSHx:   Past Surgical History:   Procedure Laterality Date     C LAP OVARIAN CYSTOTOMY         Meds:   Current Outpatient Medications   Medication     Coenzyme Q10 (CO Q 10) 10 MG CAPS     fish oil-omega-3 fatty acids 1000 MG capsule     losartan (COZAAR) 25 MG tablet     Misc Natural Products (CVS GLUCOSAMINE-CHONDROITIN) CAPS     multivitamin, therapeutic (THERA-VIT) TABS tablet     Calcium 500-100 MG-UNIT CHEW     No current facility-administered medications for this visit.        Allergies:   No Known Allergies     SocHx: Denies tobacco, alcohol and recreational  "drugs. 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 Normal hair distribution. Vagina is without lesions. physiologic discharge. Cervix nulliparous, no lesions, no cervical motion tenderness. Uterus is anteverted and slightly enlarged, about 9-10 in size. Patient was tender with bimanual exam and it was limited due to her discomfort.     Assessment/Plan  Carol Laurent is a 49 year old  female here for evaluation of known fibroid uterus. Her previous evaluation was 5 years ago in [place] and she desires another evaluation.     - Pelvic ultrasound ordered. Clinic appointment to follow to discuss results  - Cotesting collected, we will contact her with the results.   - Discussed irregular menses is likely due to perimenopause. Perimenopause is normal at her age. Discussed symptoms of menopause and that if these symptoms are bothersome we can help with HRT or other medical management depending on the bothersome symptom.     Follow Up: clinic appointment after pelvic ultrasound     Discussed with Dr. Srinivas Roque MD PhD  Ob/Gyn PGY-4  10/16/2019 3:36 PM    The Patient was seen in [facility] by MALU ROQUE.  I reviewed the history & exam. Assessment and plan were jointly made.    Melinda Espino MD      "

## 2019-10-17 ENCOUNTER — ANCILLARY PROCEDURE (OUTPATIENT)
Dept: ULTRASOUND IMAGING | Facility: CLINIC | Age: 49
End: 2019-10-17
Attending: OBSTETRICS & GYNECOLOGY
Payer: COMMERCIAL

## 2019-10-17 DIAGNOSIS — D25.9 UTERINE LEIOMYOMA, UNSPECIFIED LOCATION: ICD-10-CM

## 2019-10-17 PROCEDURE — 76830 TRANSVAGINAL US NON-OB: CPT

## 2019-10-17 ASSESSMENT — ANXIETY QUESTIONNAIRES: GAD7 TOTAL SCORE: 5

## 2019-10-18 ENCOUNTER — ANCILLARY PROCEDURE (OUTPATIENT)
Dept: MAMMOGRAPHY | Facility: CLINIC | Age: 49
End: 2019-10-18
Attending: NURSE PRACTITIONER
Payer: COMMERCIAL

## 2019-10-18 DIAGNOSIS — Z12.31 VISIT FOR SCREENING MAMMOGRAM: ICD-10-CM

## 2019-10-21 LAB
COPATH REPORT: NORMAL
PAP: NORMAL

## 2019-10-22 LAB
FINAL DIAGNOSIS: NORMAL
HPV HR 12 DNA CVX QL NAA+PROBE: NEGATIVE
HPV16 DNA SPEC QL NAA+PROBE: NEGATIVE
HPV18 DNA SPEC QL NAA+PROBE: NEGATIVE
SPECIMEN DESCRIPTION: NORMAL
SPECIMEN SOURCE CVX/VAG CYTO: NORMAL

## 2019-10-24 ENCOUNTER — ANCILLARY PROCEDURE (OUTPATIENT)
Dept: MAMMOGRAPHY | Facility: CLINIC | Age: 49
End: 2019-10-24
Attending: NURSE PRACTITIONER
Payer: COMMERCIAL

## 2019-10-24 DIAGNOSIS — R92.8 ABNORMAL FINDING ON BREAST IMAGING: ICD-10-CM

## 2019-10-24 DIAGNOSIS — R92.1 BREAST CALCIFICATIONS: Primary | ICD-10-CM

## 2019-10-24 RX ORDER — LIDOCAINE HYDROCHLORIDE AND EPINEPHRINE 10; 10 MG/ML; UG/ML
10 INJECTION, SOLUTION INFILTRATION; PERINEURAL ONCE
Status: COMPLETED | OUTPATIENT
Start: 2019-10-24 | End: 2019-10-24

## 2019-10-24 RX ORDER — IOPAMIDOL 612 MG/ML
100 INJECTION, SOLUTION INTRAVASCULAR ONCE
Status: COMPLETED | OUTPATIENT
Start: 2019-10-24 | End: 2019-10-24

## 2019-10-24 RX ORDER — LIDOCAINE HYDROCHLORIDE 10 MG/ML
10 INJECTION, SOLUTION EPIDURAL; INFILTRATION; INTRACAUDAL; PERINEURAL ONCE
Status: COMPLETED | OUTPATIENT
Start: 2019-10-24 | End: 2019-10-24

## 2019-10-24 RX ADMIN — LIDOCAINE HYDROCHLORIDE 10 ML: 10 INJECTION, SOLUTION EPIDURAL; INFILTRATION; INTRACAUDAL; PERINEURAL at 09:38

## 2019-10-24 RX ADMIN — LIDOCAINE HYDROCHLORIDE AND EPINEPHRINE 10 ML: 10; 10 INJECTION, SOLUTION INFILTRATION; PERINEURAL at 09:38

## 2019-10-24 RX ADMIN — IOPAMIDOL 100 ML: 612 INJECTION, SOLUTION INTRAVASCULAR at 09:14

## 2019-10-29 ENCOUNTER — TELEPHONE (OUTPATIENT)
Dept: MAMMOGRAPHY | Facility: CLINIC | Age: 49
End: 2019-10-29

## 2019-10-29 LAB — COPATH REPORT: NORMAL

## 2019-10-29 NOTE — TELEPHONE ENCOUNTER
Spoke to Carol about the finding of Atypical Ductal Hyperplasia, Intraductal Papilloma, and Flat epithelial atypia found during her breast biopsy done last week.  We discussed the Radiologist's recommendation of surgical consultation to discuss possible surgical removal of the area vs having frequent imaging.  Currently she is scheduled to meet with Dr. Yudith Arana on Thursday, December 12th at 8:[facility]  Carol verbalized understanding and all questions and concerns were answered at this time.

## 2019-10-30 NOTE — TELEPHONE ENCOUNTER
RECORDS STATUS - ALL OTHER DIAGNOSIS      RECORDS RECEIVED FROM: In Epic   DATE RECEIVED: In Epic (Scheduled outside onc intake)   NOTES STATUS DETAILS   OFFICE NOTE from referring provider     OFFICE NOTE from medical oncologist     DISCHARGE SUMMARY from hospital     DISCHARGE REPORT from the ER     OPERATIVE REPORT     MEDICATION LIST     CLINICAL TRIAL TREATMENTS TO DATE     LABS     PATHOLOGY REPORTS     ANYTHING RELATED TO DIAGNOSIS     GENONOMIC TESTING     TYPE:     IMAGING (NEED IMAGES & REPORT)     CT SCANS     MRI     MAMMO     ULTRASOUND     PET

## 2019-11-07 ENCOUNTER — OFFICE VISIT (OUTPATIENT)
Dept: INTERNAL MEDICINE | Facility: CLINIC | Age: 49
End: 2019-11-07
Payer: COMMERCIAL

## 2019-11-07 VITALS
WEIGHT: 164.9 LBS | BODY MASS INDEX: 30.16 KG/M2 | SYSTOLIC BLOOD PRESSURE: 131 MMHG | HEART RATE: 70 BPM | OXYGEN SATURATION: 98 % | DIASTOLIC BLOOD PRESSURE: 87 MMHG

## 2019-11-07 DIAGNOSIS — I10 BENIGN ESSENTIAL HYPERTENSION: Primary | ICD-10-CM

## 2019-11-07 ASSESSMENT — PAIN SCALES - GENERAL: PAINLEVEL: NO PAIN (0)

## 2019-11-07 NOTE — PATIENT INSTRUCTIONS
[facility] Medication Refill Request Information:  * Please contact your pharmacy regarding ANY request for medication refills.  ** [facility] Prescription Fax = [phone]  * Please allow 3 business days for routine medication refills.  * Please allow 5 business days for controlled substance medication refills.     [facility] Test Result notification information:  *You will be notified with in 7-10 days of your appointment day regarding the results of your test.  If you are on MyChart you will be notified as soon as the provider has reviewed the results and signed off on them.    [facility]: [phone]          [facility]         Internal Medicine Resident                   Continuity Clinic    Who We Are    Resident Continuity Clinic is a part of the [facility].  Resident physicians see patients independently and establish a relationship with them over the course of their three-year residency program.  As with the Primary Care Clinic, our Resident Continuity Clinic models a group practice.  If your doctor is not available, you will be able to see another resident physician.  At the end of a resident s training, patients will be transitioned to a new resident physician for ongoing care.     We treat patients with a wide array of medical needs from routine physicals, to acute illnesses, to diabetes and blood pressure management, to complex medical illness.  What is a Resident Physician?    Resident physicians hold medical degrees and are doctors. They are training to become specialists in Internal Medicine. They work under the supervision of board-certified faculty physicians.  Expectations for Your Care    We strive to provide accessible, quality care at all times.    In order to provide this care, it is best to see your primary care resident doctor consistently rather switching between providers.  In the event you do see another physician, you should schedule  a follow-up visit with your usual primary care doctor.    If you are transitioning your care from another clinic, it is helpful to have your records available for your doctor to review.    We do not prescribe controlled substances, such as ADD medications or narcotic pain medications, on your first visit.  We will review your health records and concerns prior to devising a treatment plan with you in order to provide the best care.      Clinic Services     Extended clinic hours; patient  to help navigate your visit;  parking; laboratory and imaging services with evening and weekend hours    Multiple medical and surgical specialties in one building    Complementary services, including Nutrition, Integrative Medicine, Pharmacy consultations, Mental and Behavioral Health, Sports Medicine and Physical Therapy    Thank You    We would like to thank you for choosing the [facility] Internal Medicine Resident Continuity Clinic for your primary care. You are making a priceless contribution to the training of the next generation of health care practitioners.     Contact us at [phone] for appointments or questions.    Resident Clinic Hours are Tuesdays and Thursdays, 7:30am-5:00pm    Residents   Domenico Baca MD  (Male)   Bobby Mahan MD   (Male)   Michelle Lindquist MD  (Female)  Earline Lion MD   (Female)   Jennifer Trivedi MD   (Female)    Eveline Coleman MD    (Female)   Shon Jaquez MD  (Male)   Ry Hodges MD  (Male)    Monet Grace MD  (Female)   Sara Green MD  (Female)   Lucia Rico MD    (Female)   Jae Goodwin MD  (Male)   Rakan Henderson MD  (Male)   Rahul Plasencia MD  (Male)   KEYON Del Castillo MD   (Male)   Herbert June MD  (Male)    Karen Richter MD (Female)   Emiliano West MD  (Male)   Shannon Lang MD  (Male)   Jane Barrientos MD  (Male)   Roz Marcano MD    (Female)   Venessa Alvarez MD  (Female)     Supervising Physicians   MD Vel Garcia,  MD Brent Callaway MD James Langland, MD Mary Logeais, MD Tanya Melnik, MD Charles Moldow, MD Heather Thompson Buum, MD          It was nice to see you today. Stop taking losartan. Come in for a nurse visit for a blood pressure check. Let's plan on meeting again in 3 months.       Patient Education     Controlling High Blood Pressure  High blood pressure (hypertension) is often called the silent killer. This is because many people who have it don t know it. High blood pressure can raise your risk of heart attack, stroke, and heart failure. Controlling your blood pressure can decrease your risk of these problems. Know your blood pressure and remember to check it regularly. Doing so can save your life.  Blood pressure measurements are given as 2 numbers. Systolic blood pressure is the upper number. This is the pressure when the heart contracts. Diastolic blood pressure is the lower number. This is the pressure when the heart relaxes between beats.  Blood pressure is categorized as normal, elevated, or stage 1 or stage 2 high blood pressure:    Normal blood pressure is systolic of less than 120 and diastolic of less than 80 (120/80)    Elevated blood pressure is systolic of 120 to 129 and diastolic less than 80    Stage 1 high blood pressure is systolic is 130 to 139 or diastolic between 80 to 89    Stage 2 high blood pressure is when systolic is 140 or higher or the diastolic is 90 or higher  Here are some things you can do to help control your blood pressure.    Choose heart-healthy foods    Select low-salt, low-fat foods. Limit sodium intake to 2,400 mg per day or the amount suggested by your healthcare provider.    Limit canned, dried, cured, packaged, and fast foods. These can contain a lot of salt.    Eat 8 to 10 servings of fruits and vegetables every day.    Choose lean meats, fish, or chicken.    Eat whole-grain pasta, brown rice, and beans.    Eat 2 to 3 servings of low-fat  or fat-free dairy products.    Ask your doctor about the DASH eating plan. This plan helps reduce blood pressure.    When you go to a restaurant, ask that your meal be prepared with no added salt.  Maintain a healthy weight    Ask your healthcare provider how many calories to eat a day. Then stick to that number.    Ask your healthcare provider what weight range is healthiest for you. If you are overweight, a weight loss of only 3% to 5% of your body weight can help lower blood pressure. Generally, a good weight loss goal is to lose 10% of your body weight in a year.    Limit snacks and sweets.    Get regular exercise.  Get up and get active    Choose activities you enjoy. Find ones you can do with friends or family. This includes bicycling, dancing, walking, and jogging.    Park farther away from building entrances.    Use stairs instead of the elevator.    When you can, walk or bike instead of driving.    [place] leaves, garden, or do household repairs.    Be active at a moderate to vigorous level of physical activity for at least 40 minutes for a minimum of 3 to 4 days a week.   Manage stress    Make time to relax and enjoy life. Find time to laugh.    Communicate your concerns with your loved ones and your healthcare provider.    Visit with family and friends, and keep up with hobbies.  Limit alcohol and quit smoking    Men should have no more than 2 drinks per day.    Women should have no more than 1 drink per day.    Talk with your healthcare provider about quitting smoking. Smoking significantly increases your risk for heart disease and stroke. Ask your healthcare provider about community smoking cessation programs and other options.  Medicines  If lifestyle changes aren t enough, your healthcare provider may prescribe high blood pressure medicine. Take all medicines as prescribed. If you have any questions about your medicines, ask your healthcare provider before stopping or changing them.   Date Last Reviewed:  4/27/2016 2000-2018 [employer]. [address] [place] [address], [place] [address]. All rights reserved. This information is not intended as a substitute for professional medical care. Always follow your healthcare professional's instructions.

## 2019-11-07 NOTE — NURSING NOTE
Chief Complaint   Patient presents with     Recheck Medication     Pt would like to check on blood pressure medications (wants to go back to lisinopril, losartan not working)      Surkeha Justice, EMT at 9:17 AM sign on 11/7/2019

## 2019-11-15 ENCOUNTER — ALLIED HEALTH/NURSE VISIT (OUTPATIENT)
Dept: INTERNAL MEDICINE | Facility: CLINIC | Age: 49
End: 2019-11-15
Payer: COMMERCIAL

## 2019-11-15 VITALS — DIASTOLIC BLOOD PRESSURE: 84 MMHG | OXYGEN SATURATION: 95 % | HEART RATE: 71 BPM | SYSTOLIC BLOOD PRESSURE: 130 MMHG

## 2019-11-15 DIAGNOSIS — Z01.30 BLOOD PRESSURE CHECK: Primary | ICD-10-CM

## 2019-11-15 NOTE — NURSING NOTE
Pt is here per Dr. June's request to check her blood pressure. Pt has brought a sheet of paper with blood pressure readings from the past few days that I have copied and sent to scan into her chart. Pt checked her BP today with our clinic machine and with her home monitor to ensure that her home machine is accurate. Dr. Calderon was the supervising DRRae Today if anything was needed. I informed pt that I would message Dr. June about these results and that he would inform her with wether or not she can discontinue her BP medications.    Kimberly Nissen, EMT at 10:04 AM on 11/15/2019

## 2019-12-01 ENCOUNTER — PRE VISIT (OUTPATIENT)
Dept: ONCOLOGY | Facility: CLINIC | Age: 49
End: 2019-12-01

## 2019-12-23 NOTE — PROGRESS NOTES
"                     PRIMARY CARE CENTER       SUBJECTIVE:  Carol Laurent is a 49 year old female with a history of HTN, HLD, NAFLD, who presents for follow up of HTN. The patient states she has been experiencing significant symptoms with losartan, which she predominantly took in the evening. States she experienced head \"heaviness,\" lightheadedness and head flushing after taking losartan. Has not taken losartan for the past few days. Even though she experienced mild coughing with lisinopril, she would like to restart this agent for blood pressure control. Has no other complaints today. The patient denies headache, vision changes, fever, chills, nausea, vomiting, chest pain, shortness of breath, abdominal pain, diarrhea, constipation, hematochezia, melena, dysuria or hematuria.    Medications and allergies reviewed by me today.     ROS:   10-point review of systems negative unless otherwise specified above.    OBJECTIVE:  /87 (BP Location: Right arm, Patient Position: Sitting, Cuff Size: Adult Regular)   Pulse 70   Wt 74.8 kg (164 lb 14.4 oz)   LMP 09/09/2019   SpO2 98%   Breastfeeding No   BMI 30.16 kg/m     Wt Readings from Last 1 Encounters:   11/07/19 74.8 kg (164 lb 14.4 oz)     General: Pleasant woman in NAD  HEENT: AT/NC, anicteric sclerae, conjunctivae without injection, benign oropharynx, MMM  Neck: Supple, no palpable lymphadenopathy  Cardiovascular: RRR, normal S1 S2, no m/r/g, 2+ peripheral pulses, no peripheral edema  Respiratory: LCTAB, no w/r/r, normal respiratory effort  Abdominal: Soft, NTND, normal bowel sounds, no hepatosplenomegaly  Musculoskeletal: Normal bulk, no appreciable joint abnormalities  Neurologic: CN II-XII grossly intact, no focal deficits, AAOx4    Laboratory and imaging results were reviewed by me.     ASSESSMENT/PLAN:  49 year old female with a history of HTN, HLD, NAFLD, who presents for follow up of HTN, which is well controlled presently.      Benign essential " hypertension: Patient experiencing well controlled blood pressure on minimal dose of ARB. Has not Given symptoms with both ACEi and ARB, and stable blood pressures even without having taken losartan for the past 3-4 days, will trial off antihypertensives and have patient return for an RN visit for BP check in 1 week. If blood pressure stable, will have patient discontinue antihypertensive for 3 months.        - Return RN visit in 1 week for BP check        - If BP stable, will discontinue losartan for 3 months, reassess at 3 month follow up visit    The patient should return to clinic for follow up with me in 3 months.     Herbert June MD PhD  Nov 7, 2019    The patient was seen and discussed with Dr. Ingrid Reese, who agrees with the assessment and plan.    Teaching Physician Note:  I was present during the visit and the patient was seen and examined by me.   I discussed the case with the resident and agree with the note as documented by the resident with the following exceptions:  None.    Dr. Ingrid Reese

## 2020-01-04 PROBLEM — Z00.00 ROUTINE HISTORY AND PHYSICAL EXAMINATION OF ADULT: Status: ACTIVE | Noted: 2020-01-04

## 2020-01-16 ENCOUNTER — DOCUMENTATION ONLY (OUTPATIENT)
Dept: CARE COORDINATION | Facility: CLINIC | Age: 50
End: 2020-01-16

## 2021-01-03 ENCOUNTER — HEALTH MAINTENANCE LETTER (OUTPATIENT)
Age: 51
End: 2021-01-03

## 2021-03-07 ENCOUNTER — HEALTH MAINTENANCE LETTER (OUTPATIENT)
Age: 51
End: 2021-03-07

## 2021-05-10 ENCOUNTER — ANCILLARY PROCEDURE (OUTPATIENT)
Dept: MAMMOGRAPHY | Facility: CLINIC | Age: 51
End: 2021-05-10
Attending: OBSTETRICS & GYNECOLOGY
Payer: COMMERCIAL

## 2021-05-10 DIAGNOSIS — Z12.31 VISIT FOR SCREENING MAMMOGRAM: ICD-10-CM

## 2021-05-10 PROCEDURE — 77067 SCR MAMMO BI INCL CAD: CPT | Mod: 26 | Performed by: RADIOLOGY

## 2021-05-10 PROCEDURE — 77067 SCR MAMMO BI INCL CAD: CPT

## 2021-06-13 NOTE — PROGRESS NOTES
[facility]  Gynecology Visit    SUBJECTIVE    Carol Laurent is a 51 year old  who is here for an annual examination.    Carol reports that her periods are irregular and unpredictable. In the last year, she believes she has had about 3 periods. LMP . Last year she also had some hot flashes, but feels that this year it is improving. No vaginal dryness. Carol wonders if her skin is dry because of hormone changes as she is finding her skin is itchy in the evenings.     Carol has a history of uterine fibroids and requests an ultrasound to evaluate them.     Her LMP is: Patient's last menstrual period was 2021.    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 weight loss     Nonalcoholic fatty liver disease without nonalcoholic steatohepatitis (MILLER)        MEDICATIONS  Current Outpatient Medications   Medication     Calcium 500-100 MG-UNIT CHEW     Coenzyme Q10 (CO Q 10) 10 MG CAPS     fish oil-omega-3 fatty acids 1000 MG capsule     Misc Natural Products ([employer] GLUCOSAMINE-CHONDROITIN) CAPS     multivitamin, therapeutic (THERA-VIT) TABS tablet     No current facility-administered medications for this visit.        ALLERGIES  No Known Allergies    OBSTETRIC/GYNECOLOGIC HISTORY  Current contraception: none  History of STDs: none  Lab Results   Component Value Date    PAP NIL 10/16/2019    HPV negative 10/16/19  History of abnormal Pap smear: unsure    OB History    Para Term  AB Living   3 1 1 0 2 1   SAB TAB Ectopic Multiple Live Births   2 0 0 0 1      # Outcome Date GA Lbr Quiuqe/2nd Weight Sex Delivery Anes PTL Lv   3 SAB      SAB      2 SAB      SAB      1 Term      CS-Unspec   MARTY       PAST SURGICAL HISTORY   Past Surgical History:   Procedure Laterality Date     C LAP OVARIAN  "CYSTOTOMY        SECTION         SOCIAL HISTORY  Social History     Tobacco Use     Smoking status: Never Smoker     Smokeless tobacco: Never Used   Substance Use Topics     Alcohol use: No     Comment: Previous heavy use, >5 years ago     Drug use: Never     Social History     Social History Narrative    Immigrated from [place] in . Lives alone in [place]. Son goes to college. Doctoral student in education.       FAMILY HISTORY  Family History   Problem Relation Age of Onset     Heart Disease Mother      Hypertension Father      Diabetes Father      Hypertension Brother      Liver Disease Brother      Diabetes Paternal Grandmother        REVIEW OF SYSTEMS  A 10 point review of systems including Constitutional, Eyes, Respiratory, Cardiovascular, Gastroenterology, Genitourinary, Integumentary, Musculoskeletal, and Psychiatric, were all negative, except for pertinent positives noted in the above HPI.    OBJECTIVE  BP (!) 131/92   Pulse 66   Ht 1.575 m (5' 2\")   Wt 79.4 kg (175 lb)   LMP 2021   BMI 32.01 kg/m      General: Alert, without distress  HEENT: normocephalic, without obvious abnormality   Breast: Within normal limits bilaterally, no nipple discharge, no lymphadenopathy  Cardiovascular: regular rate and rhythm, no murmurs/rubs/gallops   Lungs: clear to auscultation bilaterally   Abdomen: soft, non-tender, non-distended   Pelvic: normal external female genitalia; normal vagina without discharge; normal cervix without lesions/masses; uterus approximately 8 week size, mobile, nontender; adnexae nontender and without masses; normal anus/perineum   Extremities: normal    IMAGING:  Pelvic Ultrasound 10/17/19:  Uterine findings:              Presence: Visible Size: Normal 6.3x6.5x5.9 cm.  Endometrium = 7.2 mm.              Cx length = 38.9 mm.                            Flexion:  Midposition   Position: Midline          Margins: Smooth         Shape: Normal              Contour: " Regular        Texture: Heterogeneous         Cavity: Normal             Masses: Abnormal - multiple myomas - largest posterior - 3.7 x 3.6 x 2.6cm.     Pelvic findings:               Right Adnexa: Normal              Left Adnexa: Normal              Bladder:  Normal                                                           Cul - de - sac fluid: None     Ovarian follicles:              Right ovary:  3.1x2.6x3.4cm.                 1 homogeneous cyst                  Size(s):  2.5x 2.4 x 2.4cm                 Left ovary:  5.0x 4.9 x2.8cm.                 2 homogeneous cysts - 2.5 x 2.5 x 2.1cm, 2.8 x 2.6 x 2.4cm.                1 simple cyst                  Size(s):  2.8 x 2.5 x 2.1cm      Comments:  multiple myomata, largest 3.7x3.6cm and posterior  Normal endometrial stripe.    Mammogram 05/10/21:                                                             IMPRESSION: BI-RADS CATEGORY: 1 -  NEGATIVE.  RECOMMENDED FOLLOW-UP: Annual Mammography    ASSESSMENT  Carol Laurent is a 51 year old  who is here for an annual examination. She is perimenopausal.    PLAN  Problem   Uterine Leiomyoma, Unspecified Location   Encounter for Annual Physical Examination Excluding Gynecological Examination in A Patient Older Than 17 Years    Age 40-64 Annual Preventive Exam  1.  Screening   Cervical cancer: last pap 10/2019 NIL/HPV negative, repeat    Breast cancer: clinical exam today normal, mammogram normal, repeat 2022   Colorectal cancer: underwent FIT 2021, repeat annually   Diabetes: glucose normal on BMP 2021, repeat by    Lipids: normal 2021, repeat by    HIV: negative 2021, repeat if new risk factors        2. Uterine fibroids:    Examination today reassuring, but ultrasound ordered per Carol's request to monitor progression in size if any.     RTC in one year for an annual examination.    Karolyn Smith MD, MSCI    [facility]

## 2021-06-15 ENCOUNTER — OFFICE VISIT (OUTPATIENT)
Dept: OBGYN | Facility: CLINIC | Age: 51
End: 2021-06-15
Attending: OBSTETRICS & GYNECOLOGY
Payer: COMMERCIAL

## 2021-06-15 VITALS
WEIGHT: 175 LBS | DIASTOLIC BLOOD PRESSURE: 92 MMHG | HEIGHT: 62 IN | HEART RATE: 66 BPM | SYSTOLIC BLOOD PRESSURE: 131 MMHG | BODY MASS INDEX: 32.2 KG/M2

## 2021-06-15 DIAGNOSIS — D25.9 UTERINE LEIOMYOMA, UNSPECIFIED LOCATION: Primary | ICD-10-CM

## 2021-06-15 DIAGNOSIS — Z00.00 ENCOUNTER FOR ANNUAL PHYSICAL EXAMINATION EXCLUDING GYNECOLOGICAL EXAMINATION IN A PATIENT OLDER THAN 17 YEARS: ICD-10-CM

## 2021-06-15 PROCEDURE — 99396 PREV VISIT EST AGE 40-64: CPT | Performed by: OBSTETRICS & GYNECOLOGY

## 2021-06-15 PROCEDURE — G0463 HOSPITAL OUTPT CLINIC VISIT: HCPCS

## 2021-06-15 RX ORDER — AMLODIPINE BESYLATE 5 MG/1
5 TABLET ORAL
COMMUNITY
Start: 2021-05-07 | End: 2022-06-07

## 2021-06-15 ASSESSMENT — MIFFLIN-ST. JEOR: SCORE: 1362.04

## 2021-06-15 NOTE — LETTER
6/15/2021       RE: Carol Laurent  [address]  [place] [address]     Dear Colleague,    Thank you for referring your patient, Carol Laurent, to the [facility] [place] at [facility]. Please see a copy of my visit note below.    [facility]  Gynecology Visit    SUBJECTIVE    Carol Laurent is a 51 year old  who is here for an annual examination.    Carol reports that her periods are irregular and unpredictable. In the last year, she believes she has had about 3 periods. LMP . Last year she also had some hot flashes, but feels that this year it is improving. No vaginal dryness. Carol wonders if her skin is dry because of hormone changes as she is finding her skin is itchy in the evenings.     Carol has a history of uterine fibroids and requests an ultrasound to evaluate them.     Per consult with dr rubio in 2019:  Carol Laurent is a 49 year old , here to discuss fibroids. Per patient she was diagnosed with fibroids via an ultrasound in [place]. Denies any pelvic pain. Reports her menses is heavy at times. Up until , she had regular cycles. Now her periods have started to become irregular. Her last evaluation for fibroids was about 5 years ago, and she would like to be evaluated again. No other complaints today. Her medical history is pertinent for HTN and NAFLD. Flu vaccine on 2019     GYN History  - LMP: 2019  - Menses: see above  - Pap Smears:   About 5 years ago, does not know if she has history of abnormal pap smears   - Contraception: none  - Sexual Activity: not sexually active  - Hx STIs: denies     OBHx    SAB x2  1 Full term via  sections  Carol Laurent is a 49 year old  female here for evaluation of known fibroid uterus. Her previous evaluation was 5 years ago in [place] and she desires another evaluation.      - Pelvic ultrasound ordered. Clinic appointment to follow to discuss results  - Cotesting  collected, we will contact her with the results.   - Discussed irregular menses is likely due to perimenopause. Perimenopause is normal at her age. Discussed symptoms of menopause and that if these symptoms are bothersome we can help with HRT or other medical management depending on the bothersome symptom.      Follow Up: clinic appointment after pelvic ultrasound     Her LMP is: No LMP recorded.    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 weight loss     Nonalcoholic fatty liver disease without nonalcoholic steatohepatitis (MILLER)        MEDICATIONS  Current Outpatient Medications   Medication     Calcium 500-100 MG-UNIT CHEW     Coenzyme Q10 (CO Q 10) 10 MG CAPS     fish oil-omega-3 fatty acids 1000 MG capsule     Misc Natural Products ([employer] GLUCOSAMINE-CHONDROITIN) CAPS     multivitamin, therapeutic (THERA-VIT) TABS tablet     No current facility-administered medications for this visit.        ALLERGIES  No Known Allergies    OBSTETRIC/GYNECOLOGIC HISTORY  Current contraception: none  History of STDs: none  Lab Results   Component Value Date    PAP NIL 10/16/2019    HPV negative 10/16/19  History of abnormal Pap smear: unsure    OB History    Para Term  AB Living   3 1 1 0 2 1   SAB TAB Ectopic Multiple Live Births   2 0 0 0 1      # Outcome Date GA Lbr Quique/2nd Weight Sex Delivery Anes PTL Lv   3 SAB      SAB      2 SAB      SAB      1 Term      CS-Unspec   MARTY       PAST SURGICAL HISTORY   Past Surgical History:   Procedure Laterality Date     C LAP OVARIAN CYSTOTOMY        SECTION         SOCIAL HISTORY  Social History     Tobacco Use     Smoking status: Never Smoker     Smokeless tobacco: Never Used   Substance Use Topics     Alcohol use: No     Comment: Previous heavy use, >5 years  "ago     Drug use: Never     Social History     Social History Narrative    Immigrated from [place] in 2014. Lives alone in [place]. Son goes to college. Doctoral student in education.       FAMILY HISTORY  Family History   Problem Relation Age of Onset     Heart Disease Mother      Hypertension Father      Diabetes Father      Hypertension Brother      Liver Disease Brother      Diabetes Paternal Grandmother        REVIEW OF SYSTEMS  A 10 point review of systems including Constitutional, Eyes, Respiratory, Cardiovascular, Gastroenterology, Genitourinary, Integumentary, Musculoskeletal, and Psychiatric, were all negative, except for pertinent positives noted in the above HPI.    OBJECTIVE  BP (!) 131/92   Pulse 66   Ht 1.575 m (5' 2\")   Wt 79.4 kg (175 lb)   LMP 05/23/2021   BMI 32.01 kg/m      General: Alert, without distress  HEENT: normocephalic, without obvious abnormality   Breast: Within normal limits bilaterally, no nipple discharge, no lymphadenopathy  Cardiovascular: regular rate and rhythm, no murmurs/rubs/gallops   Lungs: clear to auscultation bilaterally   Abdomen: soft, non-tender, non-distended   Pelvic: normal external female genitalia; normal vagina without discharge; normal cervix without lesions/masses; uterus approximately 8 week size, mobile, nontender; adnexae nontender and without masses; normal anus/perineum   Extremities: normal    IMAGING:  Pelvic Ultrasound 10/17/19:  Uterine findings:              Presence: Visible Size: Normal 6.3x6.5x5.9 cm.  Endometrium = 7.2 mm.              Cx length = 38.9 mm.                            Flexion:  Midposition   Position: Midline          Margins: Smooth         Shape: Normal              Contour: Regular        Texture: Heterogeneous         Cavity: Normal             Masses: Abnormal - multiple myomas - largest posterior - 3.7 x 3.6 x 2.6cm.     Pelvic findings:               Right Adnexa: Normal              Left Adnexa: " Normal              Bladder:  Normal                                                           Cul - de - sac fluid: None     Ovarian follicles:              Right ovary:  3.1x2.6x3.4cm.                 1 homogeneous cyst                  Size(s):  2.5x 2.4 x 2.4cm                 Left ovary:  5.0x 4.9 x2.8cm.                 2 homogeneous cysts - 2.5 x 2.5 x 2.1cm, 2.8 x 2.6 x 2.4cm.                1 simple cyst                  Size(s):  2.8 x 2.5 x 2.1cm      Comments:  multiple myomata, largest 3.7x3.6cm and posterior  Normal endometrial stripe.    Mammogram 05/10/21:                                                             IMPRESSION: BI-RADS CATEGORY: 1 -  NEGATIVE.  RECOMMENDED FOLLOW-UP: Annual Mammography    ASSESSMENT  Carol Laurent is a 51 year old  who is here for an annual examination. She is perimenopausal.    PLAN  Problem   Uterine Leiomyoma, Unspecified Location   Encounter for Annual Physical Examination Excluding Gynecological Examination in A Patient Older Than 17 Years    Age 40-64 Annual Preventive Exam  1.  Screening   Cervical cancer: last pap 10/2019 NIL/HPV negative, repeat    Breast cancer: clinical exam today normal, mammogram normal, repeat 2022   Colorectal cancer: underwent FIT 2021, repeat annually   Diabetes: glucose normal on BMP 2021, repeat by    Lipids: normal 2021, repeat by    HIV: negative 2021, repeat if new risk factors        2. Uterine fibroids:    Examination today reassuring, but ultrasound ordered per Carol's request to monitor progression in size if any.     RTC in one year for an annual examination.    Karolyn Smith MD, MSCI    [facility]/OB[facility]N

## 2021-06-22 PROBLEM — D25.9 UTERINE LEIOMYOMA, UNSPECIFIED LOCATION: Status: ACTIVE | Noted: 2021-06-22

## 2021-06-23 ENCOUNTER — ANCILLARY PROCEDURE (OUTPATIENT)
Dept: ULTRASOUND IMAGING | Facility: CLINIC | Age: 51
End: 2021-06-23
Attending: OBSTETRICS & GYNECOLOGY
Payer: COMMERCIAL

## 2021-06-23 DIAGNOSIS — D25.9 UTERINE LEIOMYOMA, UNSPECIFIED LOCATION: ICD-10-CM

## 2021-06-23 PROCEDURE — 76830 TRANSVAGINAL US NON-OB: CPT

## 2021-06-23 PROCEDURE — 76830 TRANSVAGINAL US NON-OB: CPT | Mod: 26 | Performed by: OBSTETRICS & GYNECOLOGY

## 2022-01-29 ENCOUNTER — HEALTH MAINTENANCE LETTER (OUTPATIENT)
Age: 52
End: 2022-01-29

## 2022-02-17 PROBLEM — Z00.00 ENCOUNTER FOR ANNUAL PHYSICAL EXAMINATION EXCLUDING GYNECOLOGICAL EXAMINATION IN A PATIENT OLDER THAN 17 YEARS: Status: ACTIVE | Noted: 2021-06-22

## 2022-05-09 ENCOUNTER — TELEPHONE (OUTPATIENT)
Dept: OBGYN | Facility: CLINIC | Age: 52
End: 2022-05-09
Payer: COMMERCIAL

## 2022-05-09 NOTE — TELEPHONE ENCOUNTER
[facility] Call Center    Phone Message    May a detailed message be left on voicemail: yes     Reason for Call: Order(s): Other:   Reason for requested: US pelvic transabdominal and transvaginal  Date needed: 6/15/22  Provider name: Dr Dutta.   Please give pt a call to schedule the US once orders are in.  Pt is hoping to schedule same day as annual on 6/15/22      Action Taken: Message routed to:  [facility] s    Travel Screening: Not Applicable

## 2022-05-11 ENCOUNTER — TELEPHONE (OUTPATIENT)
Dept: OBGYN | Facility: CLINIC | Age: 52
End: 2022-05-11
Payer: COMMERCIAL

## 2022-05-11 DIAGNOSIS — D25.9 UTERINE LEIOMYOMA, UNSPECIFIED LOCATION: Primary | ICD-10-CM

## 2022-05-11 DIAGNOSIS — Z00.00 ENCOUNTER FOR ANNUAL PHYSICAL EXAMINATION EXCLUDING GYNECOLOGICAL EXAMINATION IN A PATIENT OLDER THAN 17 YEARS: ICD-10-CM

## 2022-05-11 NOTE — TELEPHONE ENCOUNTER
Called patient to discuss request. Patient had US ordered per request last year to monitor fibroids. Left VM to clarify if pt just desiring f/u imaging or if she's having new symptoms. Requested call back.

## 2022-05-11 NOTE — TELEPHONE ENCOUNTER
Pt called stating that she needs to have an annual US.  Previous notes states that pt has annual US for hx of Fibroids and myomectomy.

## 2022-06-07 ENCOUNTER — OFFICE VISIT (OUTPATIENT)
Dept: OBGYN | Facility: CLINIC | Age: 52
End: 2022-06-07
Attending: OBSTETRICS & GYNECOLOGY
Payer: COMMERCIAL

## 2022-06-07 ENCOUNTER — ANCILLARY PROCEDURE (OUTPATIENT)
Dept: ULTRASOUND IMAGING | Facility: CLINIC | Age: 52
End: 2022-06-07
Attending: OBSTETRICS & GYNECOLOGY
Payer: COMMERCIAL

## 2022-06-07 VITALS
SYSTOLIC BLOOD PRESSURE: 133 MMHG | DIASTOLIC BLOOD PRESSURE: 85 MMHG | HEART RATE: 69 BPM | WEIGHT: 183.7 LBS | BODY MASS INDEX: 31.36 KG/M2 | HEIGHT: 64 IN

## 2022-06-07 DIAGNOSIS — Z00.00 ENCOUNTER FOR ANNUAL PHYSICAL EXAMINATION EXCLUDING GYNECOLOGICAL EXAMINATION IN A PATIENT OLDER THAN 17 YEARS: ICD-10-CM

## 2022-06-07 DIAGNOSIS — D25.9 UTERINE LEIOMYOMA, UNSPECIFIED LOCATION: ICD-10-CM

## 2022-06-07 DIAGNOSIS — D25.1 INTRAMURAL LEIOMYOMA OF UTERUS: Primary | ICD-10-CM

## 2022-06-07 PROCEDURE — G0463 HOSPITAL OUTPT CLINIC VISIT: HCPCS

## 2022-06-07 PROCEDURE — 76830 TRANSVAGINAL US NON-OB: CPT

## 2022-06-07 PROCEDURE — 99213 OFFICE O/P EST LOW 20 MIN: CPT | Performed by: OBSTETRICS & GYNECOLOGY

## 2022-06-07 PROCEDURE — 76830 TRANSVAGINAL US NON-OB: CPT | Mod: 26 | Performed by: OBSTETRICS & GYNECOLOGY

## 2022-06-07 NOTE — LETTER
"6/7/2022       RE: Carol Laurent  [address]  [place] [address]     Dear Colleague,    Thank you for referring your patient, Carol Laurent, to the [facility] [place] at [facility]. Please see a copy of my visit note below.    [facility] Visit    CC: US follow up    S: 52 year old here for follow up of US for fibroids and ovarian cysts. Is perimenopausal, having menses every 2-3 months. Longest spacing 4 months. No bleeding between periods. No current complaints.     O: /85 (BP Location: Left arm, Patient Position: Chair)   Pulse 69   Ht 1.626 m (5' 4\")   Wt 83.3 kg (183 lb 11.2 oz)   BMI 31.53 kg/m    General: No distress      A:52 year old here for US follow up    P: Fibroid uterus- fibroids small and stable in size. No further imaging indicated at this time.   Ovarian cysts- bilateral homogenous cysts seen- similar appearing to US in 2019. Previous simple cyst on last US no longer seen. Follow up prn.   Reviewed perimenopause and bleeding expectations. Instructed to follow up if bleeding after menopause, trouble with menopausal symptoms or AUB.     Melinda Espino MD FACOG    "

## 2022-06-08 NOTE — PROGRESS NOTES
"[facility] Clinic Visit    CC: US follow up    S: 52 year old here for follow up of US for fibroids and ovarian cysts. Is perimenopausal, having menses every 2-3 months. Longest spacing 4 months. No bleeding between periods. No current complaints.     O: /85 (BP Location: Left arm, Patient Position: Chair)   Pulse 69   Ht 1.626 m (5' 4\")   Wt 83.3 kg (183 lb 11.2 oz)   BMI 31.53 kg/m    General: No distress      A:52 year old here for US follow up    P: Fibroid uterus- fibroids small and stable in size. No further imaging indicated at this time.   Ovarian cysts- bilateral homogenous cysts seen- similar appearing to US in 2019. Previous simple cyst on last US no longer seen. Follow up prn.   Reviewed perimenopause and bleeding expectations. Instructed to follow up if bleeding after menopause, trouble with menopausal symptoms or AUB.     Melinda Espino MD FACOG  "

## 2022-09-18 ENCOUNTER — HEALTH MAINTENANCE LETTER (OUTPATIENT)
Age: 52
End: 2022-09-18

## 2023-04-14 ENCOUNTER — TELEPHONE (OUTPATIENT)
Dept: OBGYN | Facility: CLINIC | Age: 53
End: 2023-04-14
Payer: COMMERCIAL

## 2023-04-14 DIAGNOSIS — D25.1 INTRAMURAL LEIOMYOMA OF UTERUS: Primary | ICD-10-CM

## 2023-04-14 NOTE — TELEPHONE ENCOUNTER
Patient returned call and is requesting yearly lab work and a pelvic US. States due to her history of having surgery and her medical history she likes to have labs and an US yearly. Offered a visit with Dr. Espino to discuss.Does not want to schedule a visit at this time. Just wants labs and US. States that she would rather not have a visit unless the labs and US say she needs to be seen. Denies any new symptoms.     Will route to Dr. Espino for advice.

## 2023-04-14 NOTE — TELEPHONE ENCOUNTER
[facility] Call Center    Phone Message    May a detailed message be left on voicemail: no     Reason for Call: Other: pt calling and wants ultra sound and labs but she was not sure what she needed, please advise with her for this     Action Taken: Other: women    Travel Screening: Not Applicable

## 2023-04-14 NOTE — TELEPHONE ENCOUNTER
Called patient to find out more about the lab work and US she is requesting. Wondering if she is having symptoms of anything at this time. Last note says no further imaging indicated at this time.     Left VM to call back to discuss.

## 2023-04-17 NOTE — TELEPHONE ENCOUNTER
Returned call to patient.  She likes to have an US annually due to history of fibroids.      Transferred to  for scheduling.

## 2023-05-07 ENCOUNTER — HEALTH MAINTENANCE LETTER (OUTPATIENT)
Age: 53
End: 2023-05-07

## 2023-06-07 ENCOUNTER — ANCILLARY PROCEDURE (OUTPATIENT)
Dept: ULTRASOUND IMAGING | Facility: CLINIC | Age: 53
End: 2023-06-07
Attending: OBSTETRICS & GYNECOLOGY
Payer: COMMERCIAL

## 2023-06-07 DIAGNOSIS — D25.1 INTRAMURAL LEIOMYOMA OF UTERUS: ICD-10-CM

## 2023-06-07 PROCEDURE — 76856 US EXAM PELVIC COMPLETE: CPT | Mod: 26 | Performed by: OBSTETRICS & GYNECOLOGY

## 2023-06-07 PROCEDURE — 76830 TRANSVAGINAL US NON-OB: CPT

## 2023-06-07 PROCEDURE — 76830 TRANSVAGINAL US NON-OB: CPT | Mod: 26 | Performed by: OBSTETRICS & GYNECOLOGY

## 2024-04-15 ENCOUNTER — MYC MEDICAL ADVICE (OUTPATIENT)
Dept: OBGYN | Facility: CLINIC | Age: 54
End: 2024-04-15

## 2024-04-15 ENCOUNTER — TELEPHONE (OUTPATIENT)
Dept: OBGYN | Facility: CLINIC | Age: 54
End: 2024-04-15

## 2024-04-15 DIAGNOSIS — D25.1 INTRAMURAL LEIOMYOMA OF UTERUS: Primary | ICD-10-CM

## 2024-04-15 NOTE — TELEPHONE ENCOUNTER
Sent the patient a [employer] message inquiring if her symptoms have changed and if so we would need to see her before putting in an order for an US.

## 2024-04-15 NOTE — TELEPHONE ENCOUNTER
[facility] Call Center    Phone Message    May a detailed message be left on voicemail: yes     Reason for Call: Other: Pt calling requesting [facility] a order for pt to get a US to check on fibroids. Please advise      Action Taken: Message routed to:  Other: [facility]    Travel Screening: Not Applicable

## 2024-04-15 NOTE — TELEPHONE ENCOUNTER
I sent a [employer] message back to the patient letting her know that the last time she was seen in clinic for her fibroids was 6-7-22.     I let her know that it would be a good idea to be seen again for her fibroids and patient stated that she will make an appointment. I will put in an US order for her to have an US and visit with Dr. Espino.   
[facility] for patient to schedule follow up with Dr. Espino to review ultrasound and check fibroids.  
No

## 2024-05-07 ENCOUNTER — ANCILLARY PROCEDURE (OUTPATIENT)
Dept: ULTRASOUND IMAGING | Facility: CLINIC | Age: 54
End: 2024-05-07
Attending: OBSTETRICS & GYNECOLOGY
Payer: COMMERCIAL

## 2024-05-07 DIAGNOSIS — D25.1 INTRAMURAL LEIOMYOMA OF UTERUS: ICD-10-CM

## 2024-05-07 PROCEDURE — 76830 TRANSVAGINAL US NON-OB: CPT | Mod: 26 | Performed by: STUDENT IN AN ORGANIZED HEALTH CARE EDUCATION/TRAINING PROGRAM

## 2024-05-07 PROCEDURE — 76856 US EXAM PELVIC COMPLETE: CPT

## 2024-05-07 PROCEDURE — 76856 US EXAM PELVIC COMPLETE: CPT | Mod: 26 | Performed by: STUDENT IN AN ORGANIZED HEALTH CARE EDUCATION/TRAINING PROGRAM

## 2024-07-14 ENCOUNTER — HEALTH MAINTENANCE LETTER (OUTPATIENT)
Age: 54
End: 2024-07-14

## 2025-07-19 ENCOUNTER — HEALTH MAINTENANCE LETTER (OUTPATIENT)
Age: 55
End: 2025-07-19